# Patient Record
Sex: FEMALE
[De-identification: names, ages, dates, MRNs, and addresses within clinical notes are randomized per-mention and may not be internally consistent; named-entity substitution may affect disease eponyms.]

---

## 2024-05-31 ENCOUNTER — DASHBOARD ENCOUNTERS (OUTPATIENT)
Age: 49
End: 2024-05-31

## 2024-05-31 ENCOUNTER — OFFICE VISIT (OUTPATIENT)
Dept: URBAN - METROPOLITAN AREA CLINIC 105 | Facility: CLINIC | Age: 49
End: 2024-05-31

## 2024-05-31 VITALS
BODY MASS INDEX: 29.81 KG/M2 | HEIGHT: 62 IN | SYSTOLIC BLOOD PRESSURE: 119 MMHG | HEART RATE: 68 BPM | DIASTOLIC BLOOD PRESSURE: 82 MMHG | TEMPERATURE: 97.3 F | WEIGHT: 162 LBS

## 2024-07-26 ENCOUNTER — OFFICE VISIT (OUTPATIENT)
Dept: URBAN - METROPOLITAN AREA CLINIC 105 | Facility: CLINIC | Age: 49
End: 2024-07-26
Payer: COMMERCIAL

## 2024-07-26 VITALS
WEIGHT: 160 LBS | BODY MASS INDEX: 29.44 KG/M2 | DIASTOLIC BLOOD PRESSURE: 66 MMHG | HEIGHT: 62 IN | HEART RATE: 94 BPM | SYSTOLIC BLOOD PRESSURE: 89 MMHG | TEMPERATURE: 97.6 F

## 2024-07-26 DIAGNOSIS — Z12.11 SCREEN FOR COLON CANCER: ICD-10-CM

## 2024-07-26 DIAGNOSIS — K59.09 CHRONIC CONSTIPATION: ICD-10-CM

## 2024-07-26 DIAGNOSIS — A04.8 OTHER SPECIFIED BACTERIAL INTESTINAL INFECTIONS: ICD-10-CM

## 2024-07-26 PROCEDURE — 99214 OFFICE O/P EST MOD 30 MIN: CPT | Performed by: INTERNAL MEDICINE

## 2024-07-26 RX ORDER — METFORMIN ER 500 MG 500 MG/1
TAKE 1 TABLET BY MOUTH DAILY WITH BREAKFAST TABLET ORAL
Qty: 90 EACH | Refills: 0 | Status: ACTIVE | COMMUNITY

## 2024-07-26 RX ORDER — OMEPRAZOLE MAGNESIUM, AMOXICILLIN AND RIFABUTIN 10; 250; 12.5 MG/1; MG/1; MG/1
4 CAPSULES CAPSULE, DELAYED RELEASE ORAL
Qty: 168 | OUTPATIENT
Start: 2024-07-26 | End: 2024-08-09

## 2024-07-26 NOTE — HPI-TODAY'S VISIT:
5/31/24 48 yo lady referred by Kaitlynn GRAFF Completed H.pylori treatment about 3 weeks ago "she sent me to get further checks to see if anything is underlying".  She was checked for H.pylori due to very bad bloating sAys symptoms are better with completion of treatment.  She has a "slight constipation issue but the bloating is very much gone".  She has a BM about 3 times a week.  No blood in stool No fhx of colon CA.  7/26/24 Here for f.u visit Says went to her pcp for repeat H.pylori testing after 6 weeks. Says still positive. Has not been retreated.  Bloating still present "but it is not as bad".

## 2024-09-12 ENCOUNTER — TELEPHONE ENCOUNTER (OUTPATIENT)
Dept: URBAN - METROPOLITAN AREA CLINIC 105 | Facility: CLINIC | Age: 49
End: 2024-09-12

## 2024-09-27 ENCOUNTER — OFFICE VISIT (OUTPATIENT)
Dept: URBAN - METROPOLITAN AREA CLINIC 105 | Facility: CLINIC | Age: 49
End: 2024-09-27

## 2025-05-14 ENCOUNTER — LAB OUTSIDE AN ENCOUNTER (OUTPATIENT)
Dept: URBAN - METROPOLITAN AREA CLINIC 124 | Facility: CLINIC | Age: 50
End: 2025-05-14

## 2025-05-14 ENCOUNTER — OFFICE VISIT (OUTPATIENT)
Dept: URBAN - METROPOLITAN AREA CLINIC 124 | Facility: CLINIC | Age: 50
End: 2025-05-14
Payer: COMMERCIAL

## 2025-05-14 DIAGNOSIS — K59.09 CHRONIC CONSTIPATION: ICD-10-CM

## 2025-05-14 DIAGNOSIS — R14.0 BLOATING: ICD-10-CM

## 2025-05-14 DIAGNOSIS — Z12.11 COLON CANCER SCREENING: ICD-10-CM

## 2025-05-14 PROCEDURE — G2211 COMPLEX E/M VISIT ADD ON: HCPCS | Performed by: INTERNAL MEDICINE

## 2025-05-14 PROCEDURE — 99215 OFFICE O/P EST HI 40 MIN: CPT | Performed by: INTERNAL MEDICINE

## 2025-05-14 RX ORDER — METFORMIN ER 500 MG 500 MG/1
TAKE 1 TABLET BY MOUTH DAILY WITH BREAKFAST TABLET ORAL
Qty: 90 EACH | Refills: 0 | Status: ON HOLD | COMMUNITY

## 2025-05-14 RX ORDER — LINACLOTIDE 145 UG/1
1 CAPSULE AT LEAST 30 MINUTES BEFORE THE FIRST MEAL OF THE DAY ON AN EMPTY STOMACH CAPSULE, GELATIN COATED ORAL ONCE A DAY
Qty: 30 | Refills: 11 | OUTPATIENT
Start: 2025-05-14 | End: 2026-05-09

## 2025-05-14 NOTE — HPI-TODAY'S VISIT:
5/31/24 50 yo lady referred by Kaitlynn GRAFF Completed H.pylori treatment about 3 weeks ago "she sent me to get further checks to see if anything is underlying".  She was checked for H.pylori due to very bad bloating sAys symptoms are better with completion of treatment.  She has a "slight constipation issue but the bloating is very much gone".  She has a BM about 3 times a week.  No blood in stool No fhx of colon CA.  7/26/24 Here for f.u visit Says went to her pcp for repeat H.pylori testing after 6 weeks. Says still positive. Has not been retreated.  Bloating still present "but it is not as bad".  ***5/2025: My first time w/patient. Concerned bc can't lose weight. Ongoing constipation, bloating, feels like worsening. Also IE. 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

## 2025-05-16 ENCOUNTER — TELEPHONE ENCOUNTER (OUTPATIENT)
Dept: URBAN - METROPOLITAN AREA CLINIC 92 | Facility: CLINIC | Age: 50
End: 2025-05-16

## 2025-05-29 ENCOUNTER — OFFICE VISIT (OUTPATIENT)
Dept: URBAN - METROPOLITAN AREA SURGERY CENTER 16 | Facility: SURGERY CENTER | Age: 50
End: 2025-05-29